# Patient Record
Sex: FEMALE | Race: OTHER
[De-identification: names, ages, dates, MRNs, and addresses within clinical notes are randomized per-mention and may not be internally consistent; named-entity substitution may affect disease eponyms.]

---

## 2017-06-17 ENCOUNTER — HOSPITAL ENCOUNTER (EMERGENCY)
Dept: HOSPITAL 72 - EMR | Age: 67
LOS: 1 days | Discharge: HOME | End: 2017-06-18
Payer: MEDICARE

## 2017-06-17 VITALS — SYSTOLIC BLOOD PRESSURE: 113 MMHG | DIASTOLIC BLOOD PRESSURE: 70 MMHG

## 2017-06-17 VITALS — WEIGHT: 220 LBS | HEIGHT: 63 IN | BODY MASS INDEX: 38.98 KG/M2
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
## 2017-06-17 DIAGNOSIS — T40.2X2A: ICD-10-CM

## 2017-06-17 DIAGNOSIS — F32.9: ICD-10-CM

## 2017-06-17 DIAGNOSIS — F10.129: ICD-10-CM

## 2017-06-17 DIAGNOSIS — T43.212A: Primary | ICD-10-CM

## 2017-06-17 LAB
ALBUMIN/GLOB SERPL: 1.3 {RATIO} (ref 1–2.7)
ALT SERPL-CCNC: 16 U/L (ref 3–33)
ANION GAP SERPL CALC-SCNC: 16 MMOL/L (ref 5–15)
APAP SERPL-MCNC: < 10 UG/ML (ref 10–30)
APPEARANCE UR: CLEAR
AST SERPL-CCNC: 20 U/L (ref 5–40)
BASOPHILS NFR BLD AUTO: 1.1 % (ref 0–2)
CALCIUM SERPL-MCNC: 10.1 MG/DL (ref 8.6–10.2)
CHLORIDE SERPL-SCNC: 99 MEQ/L (ref 98–107)
CO2 SERPL-SCNC: 26 MEQ/L (ref 20–30)
CREAT SERPL-MCNC: 0.8 MG/DL (ref 0.5–0.9)
EOSINOPHIL NFR BLD AUTO: 2.2 % (ref 0–3)
ERYTHROCYTE [DISTWIDTH] IN BLOOD BY AUTOMATED COUNT: 13.7 % (ref 11.6–14.8)
ETHANOL SERPL-MCNC: 179 MG/DL
GFR SERPLBLD BASED ON 1.73 SQ M-ARVRAT: > 60 ML/MIN (ref 60–?)
GLOBULIN SER-MCNC: 3 G/DL
HEMOLYSIS: 3
KETONES UR QL STRIP: NEGATIVE
LEUKOCYTE ESTERASE UR QL STRIP: NEGATIVE
LYMPHOCYTES NFR BLD AUTO: 37.1 % (ref 20–45)
MCH RBC QN AUTO: 31.8 PG (ref 27–31)
MCHC RBC AUTO-ENTMCNC: 32.7 G/DL (ref 32–36)
MCV RBC AUTO: 97 FL (ref 80–99)
MONOCYTES NFR BLD AUTO: 7.5 % (ref 1–10)
NEUTROPHILS NFR BLD AUTO: 52.1 % (ref 45–75)
NITRITE UR QL STRIP: NEGATIVE
PH UR STRIP: 6.5 [PH] (ref 4.5–8)
PLATELET # BLD: 282 K/UL (ref 150–450)
PMV BLD AUTO: 5.7 FL (ref 6.5–10.1)
POTASSIUM SERPL-SCNC: 3.8 MEQ/L (ref 3.4–4.9)
PROT SERPL-MCNC: 7.1 G/DL (ref 6.6–8.7)
PROT UR QL STRIP: NEGATIVE
RBC # BLD AUTO: 4.21 M/UL (ref 4.2–5.4)
SODIUM SERPL-SCNC: 141 MEQ/L (ref 135–145)
SP GR UR STRIP: 1.01 (ref 1–1.03)
UROBILINOGEN UR-MCNC: NORMAL MG/DL (ref 0–1)
WBC # BLD AUTO: 5.6 K/UL (ref 4.8–10.8)

## 2017-06-17 PROCEDURE — 99285 EMERGENCY DEPT VISIT HI MDM: CPT

## 2017-06-17 PROCEDURE — 80300: CPT

## 2017-06-17 PROCEDURE — 85025 COMPLETE CBC W/AUTO DIFF WBC: CPT

## 2017-06-17 PROCEDURE — 80329 ANALGESICS NON-OPIOID 1 OR 2: CPT

## 2017-06-17 PROCEDURE — 80053 COMPREHEN METABOLIC PANEL: CPT

## 2017-06-17 PROCEDURE — 81003 URINALYSIS AUTO W/O SCOPE: CPT

## 2017-06-17 PROCEDURE — 36415 COLL VENOUS BLD VENIPUNCTURE: CPT

## 2017-06-17 NOTE — EMERGENCY ROOM REPORT
History of Present Illness


General


Chief Complaint:  Behavioral Complaint


Source:  Patient, EMS





Present Illness


HPI


Is a 66-year-old female with a history of depression.  She presents with chief 

complaint of overdose.  She called her sister Melissa Hook who is in [place].  

I confirmed this history with her sister who called here.  Her sister said that 

patient call to say goodbye.  She took about 6 pills.  This include Norco and 

trazodone.  This occurred prior to arrival.  Here patient said that she is in a 

lot of stress and she doesn't know if she took it to kill her self or just to 

get away from a problem.  She is equivocal in suicidality.  Denies any drug 

use.  Denies any alcohol use.  No other complaint.  She has never been in a 

psychiatric inpatient facility before.





Pt also called her sister Lis Choudhury in [place], [place] at [phone] and 

expressed the same thing. It was Lis who called 911.


Allergies:  


Coded Allergies:  


     FLUOXETINE (Verified  Allergy, Unknown, 6/17/17)





Patient History


Past Medical History:  see triage record, old chart reviewed, psych hx, 

depression


Past Surgical History:  other


Family History:  none


Last Menstrual Period:  UKN


Pregnant Now:  No


Immunizations:  other


Reviewed Nursing Documentation:  PMH: Agreed, PSxH: Agreed





Nursing Documentation-PMH


Past Medical History:  No History, Except For


History Of Psychiatric Problem:  Yes - Depression





Review of Systems


ENT:  Denies: sore throat


Cardiovascular:  Denies: chest pain, palpitations


Gastrointestinal/Abdominal:  Denies: diarrhea, nausea, vomiting


Musculoskeletal:  Denies: back problems


Skin:  Denies: rash


Neurological:  Denies: HA, seizures


All Other Systems:  negative except mentioned in HPI





Physical Exam





Vital Signs








  Date Time  Temp Pulse Resp B/P Pulse Ox O2 Delivery O2 Flow Rate FiO2


 


6/17/17 19:33 98.4 80 16 112/61 97 Room Air  





vitals normal


Sp02 EP Interpretation:  reviewed, normal


General Appearance:  alert/responsive, no apparent distress, non-toxic


Head:  normocephalic, atraumatic


Eyes:  PERRL, EOMI


ENT:  oropharynx normal


Neck:  supple/symm/no masses


Respiratory:  effort normal, no rhonchi, no wheezing


Cardiovascular:  no murmur, gallop, rub


Gastrointestinal:  non-tender, no mass, non-distended, no rebound/guarding, 

normal bowel sounds


Musculoskeletal:  gait & station normal


Neurologic:  oriented x3, sensory intact, motor strength/tone normal


Skin:  no rash, normal palpation





Medical Decision Making


Diagnostic Impression:  


 Primary Impression:  


 Overdose


 Qualified Codes:  T50.902A - Poisoning by unspecified drugs, medicaments and 

biological substances, intentional self-harm, initial encounter


 Additional Impressions:  


 Suicidal intent


 Depression


 Qualified Codes:  F32.9 - Major depressive disorder, single episode, 

unspecified


 Alcohol intoxication


 Qualified Codes:  F10.920 - Alcohol use, unspecified with intoxication, 

uncomplicated


ER Course


She presents with questionable overdose.  She is awake and has no evidence of 

overdose.  She does have alcohol intoxication.  She denies any this right now.  

But I did have confirmation from 2 different family member that she called.  

She was placed on a psychiatric hold for psychiatric evaluation.  She is 

medically clear.


labs with elevated alcohol





Last Vital Signs








  Date Time  Temp Pulse Resp B/P Pulse Ox O2 Delivery O2 Flow Rate FiO2


 


6/17/17 19:33 98.4 80 16 112/61 97 Room Air  








Status:  improved


Disposition:  XFER TO PSYCH HOSP/UNIT


Condition:  Stable











SHA ANGULO M.D. Jun 17, 2017 20:23

## 2017-06-18 VITALS — SYSTOLIC BLOOD PRESSURE: 162 MMHG | DIASTOLIC BLOOD PRESSURE: 78 MMHG

## 2017-06-18 VITALS — SYSTOLIC BLOOD PRESSURE: 163 MMHG | DIASTOLIC BLOOD PRESSURE: 79 MMHG

## 2017-06-18 VITALS — DIASTOLIC BLOOD PRESSURE: 69 MMHG | SYSTOLIC BLOOD PRESSURE: 147 MMHG

## 2017-06-18 VITALS — DIASTOLIC BLOOD PRESSURE: 78 MMHG | SYSTOLIC BLOOD PRESSURE: 128 MMHG

## 2017-06-18 VITALS — DIASTOLIC BLOOD PRESSURE: 74 MMHG | SYSTOLIC BLOOD PRESSURE: 110 MMHG

## 2017-06-18 VITALS — SYSTOLIC BLOOD PRESSURE: 127 MMHG | DIASTOLIC BLOOD PRESSURE: 62 MMHG

## 2017-06-18 VITALS — DIASTOLIC BLOOD PRESSURE: 69 MMHG | SYSTOLIC BLOOD PRESSURE: 115 MMHG

## 2017-06-18 VITALS — SYSTOLIC BLOOD PRESSURE: 107 MMHG | DIASTOLIC BLOOD PRESSURE: 71 MMHG
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
## 2017-06-19 NOTE — EMERGENCY ROOM REPORT
Physical Exam





Vital Signs








  Date Time  Temp Pulse Resp B/P Pulse Ox O2 Delivery O2 Flow Rate FiO2


 


6/17/17 19:33 98.4 80 16 112/61 97 Room Air  











Medical Decision Making


Diagnostic Impression:  


 Primary Impression:  


 Overdose


 Qualified Codes:  T50.904A - Poisoning by unspecified drugs, medicaments and 

biological substances, undetermined, initial encounter


 Additional Impressions:  


 Alcohol intoxication


 Qualified Codes:  F10.920 - Alcohol use, unspecified with intoxication, 

uncomplicated


 Depression


 Qualified Codes:  F32.9 - Major depressive disorder, single episode, 

unspecified


ER Course


66-year-old female who initially presented for evaluation after reported 

overdose.  Called her sister to say goodbye.  Sister called 911





Patient initially seen and evaluated by Dr Dawn.  Please see his note for 

full history and physical





During ED course patient denied any suicidal or homicidal ideation.





Patient was evaluated by Dr. Malik in the morning.  Did not believe patient 

is a danger to herself.  Patient has her medications and has good followup.  

believes patient be safely discharged to home at this time





Diagnosis-overdose, alcoholic intoxication, depression





Stable and discharged to home.  Followup with psychiatry.  Take medications as 

prescribed.  Return to ED if symptoms recur or worsen





Last Vital Signs








  Date Time  Temp Pulse Resp B/P Pulse Ox O2 Delivery O2 Flow Rate FiO2


 


6/18/17 14:54  69 16 163/79 98 Room Air  


 


6/18/17 13:39 97.9       








Status:  improved


Disposition:  HOME, SELF-CARE


Condition:  Stable


Referrals:  


NOT CHOSEN [facility]/MD,REFERRING (PCP)


Patient Instructions:  Depression, Adult, Easy-to-Read





Additional Instructions:  


take your medications as prescribed. followup with psychiatrist











MIRYAM CASTILLO M.D. Jun 19, 2017 08:08

## 2019-08-15 ENCOUNTER — HOSPITAL ENCOUNTER (EMERGENCY)
Dept: HOSPITAL 87 - ER | Age: 69
Discharge: HOME | End: 2019-08-15
Payer: MEDICARE

## 2019-08-15 VITALS — HEIGHT: 63 IN | WEIGHT: 238.1 LBS | BODY MASS INDEX: 42.19 KG/M2

## 2019-08-15 VITALS — SYSTOLIC BLOOD PRESSURE: 114 MMHG | DIASTOLIC BLOOD PRESSURE: 69 MMHG
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
## 2019-08-15 DIAGNOSIS — I10: ICD-10-CM

## 2019-08-15 DIAGNOSIS — L03.311: ICD-10-CM
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
## 2019-08-15 DIAGNOSIS — S30.861A: Primary | ICD-10-CM

## 2019-08-15 PROCEDURE — 96365 THER/PROPH/DIAG IV INF INIT: CPT

## 2019-08-15 PROCEDURE — 96366 THER/PROPH/DIAG IV INF ADDON: CPT

## 2019-08-15 PROCEDURE — 99283 EMERGENCY DEPT VISIT LOW MDM: CPT

## 2019-08-19 ENCOUNTER — HOSPITAL ENCOUNTER (EMERGENCY)
Dept: HOSPITAL 87 - ER | Age: 69
Discharge: LEFT BEFORE DISCHARGE | End: 2019-08-19
Payer: MEDICARE

## 2019-08-19 VITALS — WEIGHT: 238.1 LBS | BODY MASS INDEX: 42.19 KG/M2 | HEIGHT: 63 IN

## 2019-08-19 VITALS — DIASTOLIC BLOOD PRESSURE: 60 MMHG | SYSTOLIC BLOOD PRESSURE: 119 MMHG

## 2019-08-19 DIAGNOSIS — L03.312: Primary | ICD-10-CM

## 2019-08-19 LAB
BASOPHILS NFR BLD AUTO: 0.6 % (ref 0–2)
CHLORIDE SERPL-SCNC: 94 MEQ/L (ref 98–107)
EOSINOPHIL NFR BLD AUTO: 0.8 % (ref 0–5)
ERYTHROCYTE [DISTWIDTH] IN BLOOD BY AUTOMATED COUNT: 14.2 % (ref 11.6–14.6)
HCT VFR BLD AUTO: 35.4 % (ref 36–48)
HGB BLD-MCNC: 12 G/DL (ref 12–16)
INR PPP: 1
LYMPHOCYTES NFR BLD AUTO: 12.3 % (ref 20–50)
MCH RBC QN AUTO: 31.7 PG (ref 28–32)
MCV RBC AUTO: 93.6 FL (ref 81–99)
MONOCYTES NFR BLD AUTO: 7.7 % (ref 2–8)
NEUTROPHILS NFR BLD AUTO: 78.6 % (ref 40–76)
PLATELET # BLD AUTO: 528 X1000/UL (ref 130–400)
PMV BLD AUTO: 7.9 FL (ref 7.4–10.4)
PROTHROMBIN TIME: 10 SEC (ref 9.6–11)
RBC # BLD AUTO: 3.78 MILL/UL (ref 4.2–5.4)

## 2019-08-19 PROCEDURE — 85025 COMPLETE CBC W/AUTO DIFF WBC: CPT

## 2019-08-19 PROCEDURE — 83605 ASSAY OF LACTIC ACID: CPT

## 2019-08-19 PROCEDURE — 96374 THER/PROPH/DIAG INJ IV PUSH: CPT

## 2019-08-19 PROCEDURE — 93005 ELECTROCARDIOGRAM TRACING: CPT

## 2019-08-19 PROCEDURE — 36415 COLL VENOUS BLD VENIPUNCTURE: CPT

## 2019-08-19 PROCEDURE — 87040 BLOOD CULTURE FOR BACTERIA: CPT

## 2019-08-19 PROCEDURE — 10060 I&D ABSCESS SIMPLE/SINGLE: CPT

## 2019-08-19 PROCEDURE — 85610 PROTHROMBIN TIME: CPT

## 2019-08-19 PROCEDURE — 96375 TX/PRO/DX INJ NEW DRUG ADDON: CPT

## 2019-08-19 PROCEDURE — 80053 COMPREHEN METABOLIC PANEL: CPT

## 2019-08-19 PROCEDURE — 74176 CT ABD & PELVIS W/O CONTRAST: CPT

## 2019-08-19 PROCEDURE — 99284 EMERGENCY DEPT VISIT MOD MDM: CPT

## 2019-08-21 ENCOUNTER — HOSPITAL ENCOUNTER (EMERGENCY)
Dept: HOSPITAL 87 - ER | Age: 69
Discharge: HOME | End: 2019-08-21
Payer: MEDICARE

## 2019-08-21 VITALS — BODY MASS INDEX: 44.08 KG/M2 | HEIGHT: 65 IN | WEIGHT: 264.55 LBS

## 2019-08-21 VITALS — DIASTOLIC BLOOD PRESSURE: 74 MMHG | SYSTOLIC BLOOD PRESSURE: 175 MMHG

## 2019-08-21 DIAGNOSIS — Z48.00: Primary | ICD-10-CM

## 2019-08-21 DIAGNOSIS — L02.212: ICD-10-CM

## 2019-08-21 PROCEDURE — 99281 EMR DPT VST MAYX REQ PHY/QHP: CPT
